# Patient Record
Sex: MALE | Race: BLACK OR AFRICAN AMERICAN | NOT HISPANIC OR LATINO | URBAN - METROPOLITAN AREA
[De-identification: names, ages, dates, MRNs, and addresses within clinical notes are randomized per-mention and may not be internally consistent; named-entity substitution may affect disease eponyms.]

---

## 2017-02-22 ENCOUNTER — EMERGENCY (EMERGENCY)
Facility: HOSPITAL | Age: 57
LOS: 1 days | Discharge: HOME | End: 2017-02-22
Attending: EMERGENCY MEDICINE
Payer: COMMERCIAL

## 2017-02-22 VITALS
DIASTOLIC BLOOD PRESSURE: 82 MMHG | OXYGEN SATURATION: 99 % | TEMPERATURE: 98 F | SYSTOLIC BLOOD PRESSURE: 135 MMHG | RESPIRATION RATE: 18 BRPM | HEART RATE: 73 BPM

## 2017-02-22 VITALS
TEMPERATURE: 99 F | RESPIRATION RATE: 20 BRPM | SYSTOLIC BLOOD PRESSURE: 147 MMHG | HEART RATE: 71 BPM | OXYGEN SATURATION: 100 % | DIASTOLIC BLOOD PRESSURE: 91 MMHG
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
## 2017-02-22 DIAGNOSIS — R51 HEADACHE: ICD-10-CM

## 2017-02-22 DIAGNOSIS — S13.4XXA SPRAIN OF LIGAMENTS OF CERVICAL SPINE, INITIAL ENCOUNTER: ICD-10-CM

## 2017-02-22 DIAGNOSIS — M54.2 CERVICALGIA: ICD-10-CM

## 2017-02-22 DIAGNOSIS — S33.5XXA SPRAIN OF LIGAMENTS OF LUMBAR SPINE, INITIAL ENCOUNTER: ICD-10-CM
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
## 2017-02-22 PROCEDURE — 72040 X-RAY EXAM NECK SPINE 2-3 VW: CPT | Mod: 26

## 2017-02-22 PROCEDURE — 99284 EMERGENCY DEPT VISIT MOD MDM: CPT | Mod: 25

## 2017-02-22 PROCEDURE — 72100 X-RAY EXAM L-S SPINE 2/3 VWS: CPT | Mod: 26

## 2017-02-22 PROCEDURE — 72040 X-RAY EXAM NECK SPINE 2-3 VW: CPT

## 2017-02-22 PROCEDURE — 72100 X-RAY EXAM L-S SPINE 2/3 VWS: CPT

## 2017-02-22 PROCEDURE — 99053 MED SERV 10PM-8AM 24 HR FAC: CPT

## 2017-02-22 RX ORDER — IBUPROFEN 200 MG
1 TABLET ORAL
Qty: 20 | Refills: 0 | OUTPATIENT
Start: 2017-02-22 | End: 2017-02-27

## 2017-02-22 RX ORDER — IBUPROFEN 200 MG
600 TABLET ORAL ONCE
Qty: 0 | Refills: 0 | Status: COMPLETED | OUTPATIENT
Start: 2017-02-22 | End: 2017-02-22

## 2017-02-22 RX ORDER — METHOCARBAMOL 500 MG/1
1 TABLET, FILM COATED ORAL
Qty: 15 | Refills: 0 | OUTPATIENT
Start: 2017-02-22 | End: 2017-02-27

## 2017-02-22 RX ORDER — METHOCARBAMOL 500 MG/1
1500 TABLET, FILM COATED ORAL ONCE
Qty: 0 | Refills: 0 | Status: COMPLETED | OUTPATIENT
Start: 2017-02-22 | End: 2017-02-22

## 2017-02-22 RX ADMIN — METHOCARBAMOL 1500 MILLIGRAM(S): 500 TABLET, FILM COATED ORAL at 23:43

## 2017-02-22 RX ADMIN — Medication 600 MILLIGRAM(S): at 23:08
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
## 2017-02-22 NOTE — ED PROVIDER NOTE - NS ED MD SCRIBE ATTENDING SCRIBE SECTIONS
HIV/INTAKE ASSESSMENT/SCREENINGS/VITAL SIGNS( Pullset)/DISPOSITION/PHYSICAL EXAM/PAST MEDICAL/SURGICAL/SOCIAL HISTORY/REVIEW OF SYSTEMS/HISTORY OF PRESENT ILLNESS
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
## 2022-12-16 ENCOUNTER — OFFICE (OUTPATIENT)
Dept: URBAN - METROPOLITAN AREA CLINIC 94 | Facility: CLINIC | Age: 62
Setting detail: OPHTHALMOLOGY
End: 2022-12-16
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
## 2022-12-16 PROCEDURE — NO SHOW FE NO SHOW FEE: Performed by: OPHTHALMOLOGY

## 2023-05-25 ENCOUNTER — OFFICE (OUTPATIENT)
Dept: URBAN - METROPOLITAN AREA CLINIC 115 | Facility: CLINIC | Age: 63
Setting detail: OPHTHALMOLOGY
End: 2023-05-25
Payer: COMMERCIAL

## 2023-05-25 DIAGNOSIS — H40.2212: ICD-10-CM

## 2023-05-25 DIAGNOSIS — H40.2222: ICD-10-CM

## 2023-05-25 DIAGNOSIS — H25.13: ICD-10-CM

## 2023-05-25 PROCEDURE — 92133 CPTRZD OPH DX IMG PST SGM ON: CPT | Performed by: OPHTHALMOLOGY

## 2023-05-25 PROCEDURE — 99214 OFFICE O/P EST MOD 30 MIN: CPT | Performed by: OPHTHALMOLOGY

## 2023-05-25 ASSESSMENT — SPHEQUIV_DERIVED
OD_SPHEQUIV: 0
OS_SPHEQUIV: 0.25
OS_SPHEQUIV: 0.375
OD_SPHEQUIV: 0.125

## 2023-05-25 ASSESSMENT — REFRACTION_AUTOREFRACTION
OD_SPHERE: +0.50
OS_CYLINDER: -1.25
OD_CYLINDER: -0.75
OS_SPHERE: +1.00
OD_AXIS: 089
OS_AXIS: 082

## 2023-05-25 ASSESSMENT — REFRACTION_MANIFEST
OS_CYLINDER: -1.00
OD_SPHERE: +0.50
OD_AXIS: 100
OS_ADD: +1.75
OS_VA2: 20/20
OS_SPHERE: +0.75
OD_ADD: +1.75
OD_VA1: 20/20
OS_VA1: 20/20
OS_AXIS: 088
OD_VA2: 20/20
OD_CYLINDER: -1.00

## 2023-05-25 ASSESSMENT — KERATOMETRY
OS_K2POWER_DIOPTERS: 42.75
OD_AXISANGLE_DEGREES: 011
OD_K1POWER_DIOPTERS: 42.25
OS_K1POWER_DIOPTERS: 42.50
METHOD_AUTO_MANUAL: AUTO
OS_AXISANGLE_DEGREES: 145
OD_K2POWER_DIOPTERS: 43.00

## 2023-05-25 ASSESSMENT — PACHYMETRY
OS_CT_UM: 493
OS_CT_CORRECTION: 4
OD_CT_CORRECTION: 2
OD_CT_UM: 514

## 2023-05-25 ASSESSMENT — VISUAL ACUITY
OD_BCVA: 20/25
OS_BCVA: 20/20

## 2023-05-25 ASSESSMENT — AXIALLENGTH_DERIVED
OS_AL: 23.7685
OD_AL: 23.9177
OD_AL: 23.8678
OS_AL: 23.818

## 2023-05-25 ASSESSMENT — CONFRONTATIONAL VISUAL FIELD TEST (CVF)
OS_FINDINGS: FULL
OD_FINDINGS: FULL

## 2023-06-14 ENCOUNTER — OFFICE (OUTPATIENT)
Dept: URBAN - METROPOLITAN AREA CLINIC 115 | Facility: CLINIC | Age: 63
Setting detail: OPHTHALMOLOGY
End: 2023-06-14
Payer: COMMERCIAL

## 2023-06-14 DIAGNOSIS — H40.2222: ICD-10-CM

## 2023-06-14 DIAGNOSIS — H40.2212: ICD-10-CM

## 2023-06-14 DIAGNOSIS — H25.13: ICD-10-CM

## 2023-06-14 PROCEDURE — 99213 OFFICE O/P EST LOW 20 MIN: CPT | Performed by: OPHTHALMOLOGY

## 2023-06-14 ASSESSMENT — AXIALLENGTH_DERIVED
OD_AL: 23.9177
OS_AL: 23.818
OD_AL: 23.6701
OS_AL: 23.7192

## 2023-06-14 ASSESSMENT — KERATOMETRY
OS_AXISANGLE_DEGREES: 145
OS_K2POWER_DIOPTERS: 42.75
OD_AXISANGLE_DEGREES: 011
METHOD_AUTO_MANUAL: AUTO
OD_K2POWER_DIOPTERS: 43.00
OD_K1POWER_DIOPTERS: 42.25
OS_K1POWER_DIOPTERS: 42.50

## 2023-06-14 ASSESSMENT — VISUAL ACUITY
OD_BCVA: 20/25-2
OS_BCVA: 20/25-

## 2023-06-14 ASSESSMENT — REFRACTION_MANIFEST
OS_VA1: 20/20
OD_CYLINDER: -1.00
OS_AXIS: 088
OS_CYLINDER: -1.00
OS_VA2: 20/20
OD_AXIS: 100
OD_VA2: 20/20
OS_ADD: +1.75
OS_SPHERE: +0.75
OD_ADD: +1.75
OD_SPHERE: +0.50
OD_VA1: 20/20

## 2023-06-14 ASSESSMENT — PACHYMETRY
OD_CT_UM: 514
OS_CT_UM: 493
OD_CT_CORRECTION: 2
OS_CT_CORRECTION: 4

## 2023-06-14 ASSESSMENT — REFRACTION_AUTOREFRACTION
OS_SPHERE: +1.25
OS_CYLINDER: -1.50
OD_SPHERE: +1.00
OD_AXIS: 085
OS_AXIS: 073
OD_CYLINDER: -0.75

## 2023-06-14 ASSESSMENT — SPHEQUIV_DERIVED
OS_SPHEQUIV: 0.5
OS_SPHEQUIV: 0.25
OD_SPHEQUIV: 0
OD_SPHEQUIV: 0.625

## 2023-06-14 ASSESSMENT — CONFRONTATIONAL VISUAL FIELD TEST (CVF)
OS_FINDINGS: FULL
OD_FINDINGS: FULL

## 2023-07-27 ENCOUNTER — OFFICE (OUTPATIENT)
Dept: URBAN - METROPOLITAN AREA CLINIC 115 | Facility: CLINIC | Age: 63
Setting detail: OPHTHALMOLOGY
End: 2023-07-27
Payer: COMMERCIAL

## 2023-07-27 DIAGNOSIS — H40.2222: ICD-10-CM

## 2023-07-27 DIAGNOSIS — H40.2212: ICD-10-CM

## 2023-07-27 DIAGNOSIS — H25.13: ICD-10-CM

## 2023-07-27 PROCEDURE — 92020 GONIOSCOPY: CPT | Performed by: OPHTHALMOLOGY

## 2023-07-27 PROCEDURE — 92012 INTRM OPH EXAM EST PATIENT: CPT | Performed by: OPHTHALMOLOGY

## 2023-07-27 PROCEDURE — 92250 FUNDUS PHOTOGRAPHY W/I&R: CPT | Performed by: OPHTHALMOLOGY

## 2023-07-27 PROCEDURE — 92083 EXTENDED VISUAL FIELD XM: CPT | Performed by: OPHTHALMOLOGY

## 2023-07-27 ASSESSMENT — VISUAL ACUITY
OD_BCVA: 20/20-
OS_BCVA: 20/25-

## 2023-07-27 ASSESSMENT — AXIALLENGTH_DERIVED
OD_AL: 23.9177
OS_AL: 23.818
OD_AL: 23.7192
OS_AL: 23.8678

## 2023-07-27 ASSESSMENT — KERATOMETRY
OS_K1POWER_DIOPTERS: 42.50
METHOD_AUTO_MANUAL: AUTO
OD_AXISANGLE_DEGREES: 011
OD_K2POWER_DIOPTERS: 43.00
OS_K2POWER_DIOPTERS: 42.75
OD_K1POWER_DIOPTERS: 42.25
OS_AXISANGLE_DEGREES: 145

## 2023-07-27 ASSESSMENT — REFRACTION_MANIFEST
OS_AXIS: 088
OD_AXIS: 100
OS_CYLINDER: -1.00
OD_CYLINDER: -1.00
OS_VA2: 20/20
OS_SPHERE: +0.75
OD_ADD: +1.75
OD_VA2: 20/20
OS_ADD: +1.75
OD_SPHERE: +0.50
OS_VA1: 20/20
OD_VA1: 20/20

## 2023-07-27 ASSESSMENT — REFRACTION_AUTOREFRACTION
OS_CYLINDER: -1.25
OD_SPHERE: +1.00
OS_SPHERE: +0.75
OD_AXIS: 079
OD_CYLINDER: -1.00
OS_AXIS: 073

## 2023-07-27 ASSESSMENT — PACHYMETRY
OS_CT_UM: 493
OS_CT_CORRECTION: 4
OD_CT_CORRECTION: 2
OD_CT_UM: 514

## 2023-07-27 ASSESSMENT — SPHEQUIV_DERIVED
OS_SPHEQUIV: 0.25
OD_SPHEQUIV: 0
OS_SPHEQUIV: 0.125
OD_SPHEQUIV: 0.5

## 2023-07-27 ASSESSMENT — CONFRONTATIONAL VISUAL FIELD TEST (CVF)
OD_FINDINGS: FULL
OS_FINDINGS: FULL

## 2023-09-01 ENCOUNTER — ASC (OUTPATIENT)
Dept: URBAN - METROPOLITAN AREA SURGERY 8 | Facility: SURGERY | Age: 63
Setting detail: OPHTHALMOLOGY
End: 2023-09-01
Payer: COMMERCIAL

## 2023-09-01 DIAGNOSIS — H40.2212: ICD-10-CM

## 2023-09-01 PROCEDURE — 65855 TRABECULOPLASTY LASER SURG: CPT | Performed by: OPHTHALMOLOGY

## 2023-09-01 ASSESSMENT — REFRACTION_MANIFEST
OD_AXIS: 100
OD_VA1: 20/20
OD_VA2: 20/20
OD_CYLINDER: -1.00
OS_SPHERE: +0.75
OS_ADD: +1.75
OS_VA2: 20/20
OS_VA1: 20/20
OD_ADD: +1.75
OS_CYLINDER: -1.00
OS_AXIS: 088
OD_SPHERE: +0.50

## 2023-09-01 ASSESSMENT — SPHEQUIV_DERIVED
OD_SPHEQUIV: 0.5
OD_SPHEQUIV: 0
OS_SPHEQUIV: 0.25
OS_SPHEQUIV: 0.125

## 2023-09-01 ASSESSMENT — VISUAL ACUITY
OS_BCVA: 20/25-
OD_BCVA: 20/20-

## 2023-09-01 ASSESSMENT — KERATOMETRY
METHOD_AUTO_MANUAL: AUTO
OS_K1POWER_DIOPTERS: 42.50
OS_K2POWER_DIOPTERS: 42.75
OD_AXISANGLE_DEGREES: 011
OD_K2POWER_DIOPTERS: 43.00
OD_K1POWER_DIOPTERS: 42.25
OS_AXISANGLE_DEGREES: 145

## 2023-09-01 ASSESSMENT — REFRACTION_AUTOREFRACTION
OD_SPHERE: +1.00
OD_AXIS: 079
OS_AXIS: 073
OS_SPHERE: +0.75
OS_CYLINDER: -1.25
OD_CYLINDER: -1.00

## 2023-09-01 ASSESSMENT — AXIALLENGTH_DERIVED
OS_AL: 23.818
OD_AL: 23.7192
OS_AL: 23.8678
OD_AL: 23.9177

## 2023-10-20 ENCOUNTER — ASC (OUTPATIENT)
Dept: URBAN - METROPOLITAN AREA SURGERY 8 | Facility: SURGERY | Age: 63
Setting detail: OPHTHALMOLOGY
End: 2023-10-20
Payer: COMMERCIAL

## 2023-10-20 DIAGNOSIS — H40.2222: ICD-10-CM

## 2023-10-20 PROCEDURE — 65855 TRABECULOPLASTY LASER SURG: CPT | Mod: LT | Performed by: OPHTHALMOLOGY

## 2023-10-20 ASSESSMENT — REFRACTION_AUTOREFRACTION
OS_AXIS: 073
OS_SPHERE: +0.75
OD_AXIS: 079
OD_CYLINDER: -1.00
OS_CYLINDER: -1.25
OD_SPHERE: +1.00

## 2023-10-20 ASSESSMENT — KERATOMETRY
OD_K1POWER_DIOPTERS: 42.25
OS_K1POWER_DIOPTERS: 42.50
OD_K2POWER_DIOPTERS: 43.00
METHOD_AUTO_MANUAL: AUTO
OD_AXISANGLE_DEGREES: 011
OS_K2POWER_DIOPTERS: 42.75
OS_AXISANGLE_DEGREES: 145

## 2023-10-20 ASSESSMENT — REFRACTION_MANIFEST
OD_SPHERE: +0.50
OS_SPHERE: +0.75
OD_AXIS: 100
OD_CYLINDER: -1.00
OS_VA2: 20/20
OD_VA1: 20/20
OS_VA1: 20/20
OD_ADD: +1.75
OS_CYLINDER: -1.00
OD_VA2: 20/20
OS_AXIS: 088
OS_ADD: +1.75

## 2023-10-20 ASSESSMENT — SPHEQUIV_DERIVED
OD_SPHEQUIV: 0
OD_SPHEQUIV: 0.5
OS_SPHEQUIV: 0.125
OS_SPHEQUIV: 0.25

## 2023-10-20 ASSESSMENT — AXIALLENGTH_DERIVED
OD_AL: 23.7192
OS_AL: 23.818
OD_AL: 23.9177
OS_AL: 23.8678

## 2023-10-20 ASSESSMENT — VISUAL ACUITY
OD_BCVA: 20/20-
OS_BCVA: 20/25-

## 2023-11-17 ENCOUNTER — OFFICE (OUTPATIENT)
Dept: URBAN - METROPOLITAN AREA CLINIC 94 | Facility: CLINIC | Age: 63
Setting detail: OPHTHALMOLOGY
End: 2023-11-17
Payer: COMMERCIAL

## 2023-11-17 DIAGNOSIS — H40.2222: ICD-10-CM

## 2023-11-17 DIAGNOSIS — H40.2212: ICD-10-CM

## 2023-11-17 PROCEDURE — 92012 INTRM OPH EXAM EST PATIENT: CPT | Performed by: OPHTHALMOLOGY

## 2023-11-17 ASSESSMENT — REFRACTION_AUTOREFRACTION
OD_CYLINDER: -1.00
OS_SPHERE: +0.75
OD_SPHERE: +0.50
OS_AXIS: 081
OS_CYLINDER: -1.25
OD_AXIS: 084

## 2023-11-17 ASSESSMENT — CONFRONTATIONAL VISUAL FIELD TEST (CVF)
OS_FINDINGS: FULL
OD_FINDINGS: FULL

## 2023-11-17 ASSESSMENT — REFRACTION_MANIFEST
OS_VA1: 20/20
OS_AXIS: 088
OD_VA1: 20/20
OD_SPHERE: +0.50
OS_VA2: 20/20
OD_VA2: 20/20
OD_AXIS: 100
OS_CYLINDER: -1.00
OS_ADD: +1.75
OS_SPHERE: +0.75
OD_ADD: +1.75
OD_CYLINDER: -1.00

## 2023-11-17 ASSESSMENT — SPHEQUIV_DERIVED
OD_SPHEQUIV: 0
OS_SPHEQUIV: 0.25
OS_SPHEQUIV: 0.125
OD_SPHEQUIV: 0

## 2024-01-02 ENCOUNTER — OFFICE (OUTPATIENT)
Dept: URBAN - METROPOLITAN AREA CLINIC 94 | Facility: CLINIC | Age: 64
Setting detail: OPHTHALMOLOGY
End: 2024-01-02
Payer: COMMERCIAL

## 2024-01-02 DIAGNOSIS — H40.2212: ICD-10-CM

## 2024-01-02 DIAGNOSIS — H40.2222: ICD-10-CM

## 2024-01-02 PROCEDURE — 92012 INTRM OPH EXAM EST PATIENT: CPT | Performed by: OPHTHALMOLOGY

## 2024-01-02 ASSESSMENT — REFRACTION_MANIFEST
OD_ADD: +1.75
OS_VA1: 20/20
OS_CYLINDER: -1.00
OS_SPHERE: +0.75
OD_SPHERE: +0.50
OD_VA1: 20/20
OD_VA2: 20/20
OS_ADD: +1.75
OS_AXIS: 088
OD_AXIS: 100
OD_CYLINDER: -1.00
OS_VA2: 20/20

## 2024-01-02 ASSESSMENT — SPHEQUIV_DERIVED
OD_SPHEQUIV: 0
OS_SPHEQUIV: 0.25
OS_SPHEQUIV: 0.125
OD_SPHEQUIV: 0

## 2024-01-02 ASSESSMENT — REFRACTION_AUTOREFRACTION
OD_CYLINDER: -1.00
OS_SPHERE: +0.75
OS_AXIS: 081
OD_AXIS: 084
OD_SPHERE: +0.50
OS_CYLINDER: -1.25

## 2024-01-02 ASSESSMENT — CONFRONTATIONAL VISUAL FIELD TEST (CVF)
OS_FINDINGS: FULL
OD_FINDINGS: FULL

## 2024-04-05 ENCOUNTER — OFFICE (OUTPATIENT)
Dept: URBAN - METROPOLITAN AREA CLINIC 94 | Facility: CLINIC | Age: 64
Setting detail: OPHTHALMOLOGY
End: 2024-04-05
Payer: COMMERCIAL

## 2024-04-05 DIAGNOSIS — H40.2212: ICD-10-CM

## 2024-04-05 DIAGNOSIS — H40.2222: ICD-10-CM

## 2024-04-05 PROCEDURE — 92014 COMPRE OPH EXAM EST PT 1/>: CPT | Performed by: OPHTHALMOLOGY

## 2024-04-05 PROCEDURE — 92133 CPTRZD OPH DX IMG PST SGM ON: CPT | Performed by: OPHTHALMOLOGY

## 2024-08-02 ENCOUNTER — OFFICE (OUTPATIENT)
Dept: URBAN - METROPOLITAN AREA CLINIC 94 | Facility: CLINIC | Age: 64
Setting detail: OPHTHALMOLOGY
End: 2024-08-02
Payer: COMMERCIAL

## 2024-08-02 DIAGNOSIS — H40.2232: ICD-10-CM

## 2024-08-02 PROCEDURE — 92250 FUNDUS PHOTOGRAPHY W/I&R: CPT | Performed by: OPHTHALMOLOGY

## 2024-08-02 PROCEDURE — 92012 INTRM OPH EXAM EST PATIENT: CPT | Performed by: OPHTHALMOLOGY

## 2024-08-02 PROCEDURE — 92020 GONIOSCOPY: CPT | Performed by: OPHTHALMOLOGY

## 2024-08-02 PROCEDURE — 92083 EXTENDED VISUAL FIELD XM: CPT | Performed by: OPHTHALMOLOGY

## 2024-08-02 ASSESSMENT — CONFRONTATIONAL VISUAL FIELD TEST (CVF)
OS_FINDINGS: FULL
OD_FINDINGS: FULL

## 2024-12-06 ENCOUNTER — OFFICE (OUTPATIENT)
Dept: URBAN - METROPOLITAN AREA CLINIC 94 | Facility: CLINIC | Age: 64
Setting detail: OPHTHALMOLOGY
End: 2024-12-06
Payer: COMMERCIAL

## 2024-12-06 DIAGNOSIS — H40.2212: ICD-10-CM

## 2024-12-06 DIAGNOSIS — H25.13: ICD-10-CM

## 2024-12-06 DIAGNOSIS — H40.2222: ICD-10-CM

## 2024-12-06 PROCEDURE — 99213 OFFICE O/P EST LOW 20 MIN: CPT | Performed by: OPHTHALMOLOGY

## 2024-12-06 ASSESSMENT — REFRACTION_AUTOREFRACTION
OD_SPHERE: +0.75
OS_SPHERE: +0.75
OD_AXIS: 086
OD_CYLINDER: -1.00
OS_CYLINDER: -1.25
OS_AXIS: 077

## 2024-12-06 ASSESSMENT — REFRACTION_MANIFEST
OS_VA2: 20/20
OS_SPHERE: +0.75
OD_CYLINDER: -1.00
OD_ADD: +1.75
OD_SPHERE: +0.50
OD_AXIS: 100
OS_AXIS: 088
OS_ADD: +1.75
OS_CYLINDER: -1.00
OD_VA2: 20/20
OS_VA1: 20/20
OD_VA1: 20/20

## 2024-12-06 ASSESSMENT — PACHYMETRY
OD_CT_UM: 514
OS_CT_CORRECTION: 4
OS_CT_UM: 493
OD_CT_CORRECTION: 2

## 2024-12-06 ASSESSMENT — KERATOMETRY
OD_AXISANGLE_DEGREES: 012
OS_K1POWER_DIOPTERS: 42.50
METHOD_AUTO_MANUAL: AUTO
OS_K2POWER_DIOPTERS: 43.50
OD_K1POWER_DIOPTERS: 42.25
OD_K2POWER_DIOPTERS: 43.25
OS_AXISANGLE_DEGREES: 152

## 2024-12-06 ASSESSMENT — CONFRONTATIONAL VISUAL FIELD TEST (CVF)
OS_FINDINGS: FULL
OD_FINDINGS: FULL

## 2024-12-06 ASSESSMENT — VISUAL ACUITY
OS_BCVA: 20/25-1
OD_BCVA: 20/25

## 2025-04-04 ENCOUNTER — OFFICE (OUTPATIENT)
Dept: URBAN - METROPOLITAN AREA CLINIC 94 | Facility: CLINIC | Age: 65
Setting detail: OPHTHALMOLOGY
End: 2025-04-04
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
## 2025-04-04 PROCEDURE — NO SHOW FE NO SHOW FEE: Performed by: OPHTHALMOLOGY
